# Patient Record
Sex: FEMALE | Race: WHITE | NOT HISPANIC OR LATINO | URBAN - METROPOLITAN AREA
[De-identification: names, ages, dates, MRNs, and addresses within clinical notes are randomized per-mention and may not be internally consistent; named-entity substitution may affect disease eponyms.]

---

## 2020-07-25 ENCOUNTER — TELEPHONE ENCOUNTER (OUTPATIENT)
Dept: URBAN - METROPOLITAN AREA CLINIC 13 | Facility: CLINIC | Age: 70
End: 2020-07-25

## 2020-07-25 RX ORDER — SODIUM PICOSULFATE, MAGNESIUM OXIDE, AND ANHYDROUS CITRIC ACID 10; 3.5; 12 MG/160ML; G/160ML; G/160ML
TAKE 1 ML DAILY LIQUID ORAL
Qty: 1 | Refills: 0 | OUTPATIENT
Start: 2019-07-12 | End: 2019-07-31

## 2020-07-25 RX ORDER — POLYETHYLENE GLYCOL 3350, SODIUM CHLORIDE, SODIUM BICARBONATE AND POTASSIUM CHLORIDE WITH LEMON FLAVOR 420; 11.2; 5.72; 1.48 G/4L; G/4L; G/4L; G/4L
MIX CONTENTS PER PKG DIRECTIONS. DAY PRIOR TO PROCEDURE BEGIN DRINKING 1/2 SOULTION @ 5PM, COMPLETE  SECOND 1/2 6HR PRIOR TO PROCEDURE POWDER, FOR SOLUTION ORAL
Qty: 1 | Refills: 0 | OUTPATIENT
Start: 2019-12-23 | End: 2020-01-20

## 2020-07-25 RX ORDER — POLYETHYLENE GLYCOL 3350, SODIUM CHLORIDE, SODIUM BICARBONATE AND POTASSIUM CHLORIDE WITH LEMON FLAVOR 420; 11.2; 5.72; 1.48 G/4L; G/4L; G/4L; G/4L
TAKE 1/2 GALLON AT 5:00 PM DAY BEFORE PROCEDURE, TAKE SECOND 1/2 OF GALLON 6 HRS PRIOR TO PROCEDURE POWDER, FOR SOLUTION ORAL
Qty: 1 | Refills: 0 | OUTPATIENT
Start: 2019-07-17 | End: 2019-07-31

## 2020-07-26 ENCOUNTER — TELEPHONE ENCOUNTER (OUTPATIENT)
Dept: URBAN - METROPOLITAN AREA CLINIC 13 | Facility: CLINIC | Age: 70
End: 2020-07-26

## 2020-07-26 RX ORDER — UBIDECARENONE 30 MG
TAKE 1 TABLET DAILY CAPSULE ORAL
Refills: 0 | Status: ACTIVE | COMMUNITY
Start: 2019-07-12

## 2020-07-26 RX ORDER — CLOBETASOL PROPIONATE 0.5 MG/G
CREAM TOPICAL
Qty: 30 | Refills: 0 | Status: ACTIVE | COMMUNITY
Start: 2019-10-23

## 2020-07-26 RX ORDER — ASCORBIC ACID 500 MG
TAKE 1 TABLET DAILY TABLET ORAL
Refills: 0 | Status: ACTIVE | COMMUNITY
Start: 2019-07-12

## 2020-07-26 RX ORDER — ASCORBIC ACID 1000 MG
TAKE 1 CAPSULE DAILY TABLET ORAL
Refills: 0 | Status: ACTIVE | COMMUNITY
Start: 2019-07-12

## 2020-07-26 RX ORDER — CLOBETASOL PROPIONATE 0.5 MG/G
CREAM TOPICAL
Qty: 30 | Refills: 0 | Status: ACTIVE | COMMUNITY
Start: 2019-08-28

## 2020-07-26 RX ORDER — ATORVASTATIN CALCIUM 20 MG/1
TAKE 1 TABLET DAILY AS DIRECTED TABLET, FILM COATED ORAL
Refills: 0 | Status: ACTIVE | COMMUNITY
Start: 2019-07-12

## 2020-07-26 RX ORDER — TRIAMCINOLONE ACETONIDE 1 MG/G
APPLY A THIN LAYER TO AFFECTED AREA(S) TWICE DAILY CREAM TOPICAL
Refills: 0 | Status: ACTIVE | COMMUNITY
Start: 2019-07-12

## 2020-07-26 RX ORDER — MUPIROCIN 20 MG/G
OINTMENT TOPICAL
Qty: 22 | Refills: 0 | Status: ACTIVE | COMMUNITY
Start: 2019-09-11

## 2020-08-11 ENCOUNTER — TELEPHONE ENCOUNTER (OUTPATIENT)
Dept: URBAN - METROPOLITAN AREA CLINIC 113 | Facility: CLINIC | Age: 70
End: 2020-08-11

## 2020-08-11 PROBLEM — 126859007: Status: ACTIVE | Noted: 2020-08-11

## 2021-02-03 ENCOUNTER — TELEPHONE ENCOUNTER (OUTPATIENT)
Dept: URBAN - METROPOLITAN AREA CLINIC 113 | Facility: CLINIC | Age: 71
End: 2021-02-03

## 2021-02-11 ENCOUNTER — LAB OUTSIDE AN ENCOUNTER (OUTPATIENT)
Dept: URBAN - METROPOLITAN AREA CLINIC 113 | Facility: CLINIC | Age: 71
End: 2021-02-11

## 2021-08-04 ENCOUNTER — LAB OUTSIDE AN ENCOUNTER (OUTPATIENT)
Dept: URBAN - METROPOLITAN AREA CLINIC 113 | Facility: CLINIC | Age: 71
End: 2021-08-04

## 2021-08-04 ENCOUNTER — TELEPHONE ENCOUNTER (OUTPATIENT)
Dept: URBAN - METROPOLITAN AREA CLINIC 113 | Facility: CLINIC | Age: 71
End: 2021-08-04

## 2021-08-04 VITALS — BODY MASS INDEX: 20.89 KG/M2 | HEIGHT: 66 IN | WEIGHT: 130 LBS

## 2021-08-04 RX ORDER — UBIDECARENONE 30 MG
TAKE 1 TABLET DAILY CAPSULE ORAL
Refills: 0 | Status: ACTIVE | COMMUNITY
Start: 2019-07-12

## 2021-08-04 RX ORDER — CLOBETASOL PROPIONATE 0.5 MG/G
CREAM TOPICAL
Qty: 30 | Refills: 0 | Status: ACTIVE | COMMUNITY
Start: 2019-08-28

## 2021-08-04 RX ORDER — MUPIROCIN 20 MG/G
OINTMENT TOPICAL
Qty: 22 | Refills: 0 | Status: ACTIVE | COMMUNITY
Start: 2019-09-11

## 2021-08-04 RX ORDER — TRIAMCINOLONE ACETONIDE 1 MG/G
APPLY A THIN LAYER TO AFFECTED AREA(S) TWICE DAILY CREAM TOPICAL
Refills: 0 | Status: ACTIVE | COMMUNITY
Start: 2019-07-12

## 2021-08-04 RX ORDER — ATORVASTATIN CALCIUM 20 MG/1
TAKE 1 TABLET DAILY AS DIRECTED TABLET, FILM COATED ORAL
Refills: 0 | Status: ACTIVE | COMMUNITY
Start: 2019-07-12

## 2021-08-04 RX ORDER — ASCORBIC ACID 1000 MG
TAKE 1 CAPSULE DAILY TABLET ORAL
Refills: 0 | Status: ACTIVE | COMMUNITY
Start: 2019-07-12

## 2021-08-04 RX ORDER — ASCORBIC ACID 500 MG
TAKE 1 TABLET DAILY TABLET ORAL
Refills: 0 | Status: ACTIVE | COMMUNITY
Start: 2019-07-12

## 2021-08-04 RX ORDER — POLYETHYLENE GLYCOL 3350, SODIUM SULFATE ANHYDROUS, SODIUM BICARBONATE, SODIUM CHLORIDE, POTASSIUM CHLORIDE 236; 22.74; 6.74; 5.86; 2.97 G/4L; G/4L; G/4L; G/4L; G/4L
AS DIRECTED POWDER, FOR SOLUTION ORAL
Qty: 4000 MILLILITER | Refills: 0 | OUTPATIENT
Start: 2021-08-04 | End: 2021-08-05

## 2021-09-20 ENCOUNTER — OFFICE VISIT (OUTPATIENT)
Dept: URBAN - METROPOLITAN AREA SURGERY CENTER 25 | Facility: SURGERY CENTER | Age: 71
End: 2021-09-20
Payer: MEDICARE

## 2021-09-20 ENCOUNTER — CLAIMS CREATED FROM THE CLAIM WINDOW (OUTPATIENT)
Dept: URBAN - METROPOLITAN AREA CLINIC 4 | Facility: CLINIC | Age: 71
End: 2021-09-20
Payer: MEDICARE

## 2021-09-20 DIAGNOSIS — Q43.8 TORTUOUS COLON: ICD-10-CM

## 2021-09-20 DIAGNOSIS — Z86.010 ADENOMAS PERSONAL HISTORY OF COLONIC POLYPS: ICD-10-CM

## 2021-09-20 DIAGNOSIS — D12.8 BENIGN NEOPLASM OF RECTUM: ICD-10-CM

## 2021-09-20 DIAGNOSIS — D12.8 ADENOMATOUS POLYP OF RECTUM: ICD-10-CM

## 2021-09-20 PROCEDURE — 45385 COLONOSCOPY W/LESION REMOVAL: CPT | Performed by: INTERNAL MEDICINE

## 2021-09-20 PROCEDURE — G8907 PT DOC NO EVENTS ON DISCHARG: HCPCS | Performed by: INTERNAL MEDICINE

## 2021-09-20 PROCEDURE — 88305 TISSUE EXAM BY PATHOLOGIST: CPT | Performed by: PATHOLOGY

## 2021-09-20 RX ORDER — ATORVASTATIN CALCIUM 20 MG/1
TAKE 1 TABLET DAILY AS DIRECTED TABLET, FILM COATED ORAL
Refills: 0 | Status: ACTIVE | COMMUNITY
Start: 2019-07-12

## 2021-09-20 RX ORDER — ASCORBIC ACID 1000 MG
TAKE 1 CAPSULE DAILY TABLET ORAL
Refills: 0 | Status: ACTIVE | COMMUNITY
Start: 2019-07-12

## 2021-09-20 RX ORDER — TRIAMCINOLONE ACETONIDE 1 MG/G
APPLY A THIN LAYER TO AFFECTED AREA(S) TWICE DAILY CREAM TOPICAL
Refills: 0 | Status: ACTIVE | COMMUNITY
Start: 2019-07-12

## 2021-09-20 RX ORDER — UBIDECARENONE 30 MG
TAKE 1 TABLET DAILY CAPSULE ORAL
Refills: 0 | Status: ACTIVE | COMMUNITY
Start: 2019-07-12

## 2021-09-20 RX ORDER — ASCORBIC ACID 500 MG
TAKE 1 TABLET DAILY TABLET ORAL
Refills: 0 | Status: ACTIVE | COMMUNITY
Start: 2019-07-12

## 2021-09-20 RX ORDER — MUPIROCIN 20 MG/G
OINTMENT TOPICAL
Qty: 22 | Refills: 0 | Status: ACTIVE | COMMUNITY
Start: 2019-09-11

## 2021-09-20 RX ORDER — CLOBETASOL PROPIONATE 0.5 MG/G
CREAM TOPICAL
Qty: 30 | Refills: 0 | Status: ACTIVE | COMMUNITY
Start: 2019-08-28

## 2021-10-04 ENCOUNTER — OFFICE VISIT (OUTPATIENT)
Dept: URBAN - METROPOLITAN AREA CLINIC 107 | Facility: CLINIC | Age: 71
End: 2021-10-04
Payer: MEDICARE

## 2021-10-04 VITALS
WEIGHT: 120 LBS | HEART RATE: 66 BPM | TEMPERATURE: 97.5 F | HEIGHT: 66 IN | SYSTOLIC BLOOD PRESSURE: 130 MMHG | BODY MASS INDEX: 19.29 KG/M2 | DIASTOLIC BLOOD PRESSURE: 68 MMHG

## 2021-10-04 DIAGNOSIS — K86.2 PANCREAS CYST: ICD-10-CM

## 2021-10-04 DIAGNOSIS — Z86.010 HISTORY OF ADENOMATOUS POLYP OF COLON: ICD-10-CM

## 2021-10-04 PROBLEM — 429047008: Status: ACTIVE | Noted: 2021-08-04

## 2021-10-04 PROBLEM — 31258000: Status: ACTIVE | Noted: 2021-10-03

## 2021-10-04 PROCEDURE — 99212 OFFICE O/P EST SF 10 MIN: CPT | Performed by: INTERNAL MEDICINE

## 2021-10-04 RX ORDER — ATORVASTATIN CALCIUM 20 MG/1
TAKE 1 TABLET DAILY AS DIRECTED TABLET, FILM COATED ORAL
Refills: 0 | Status: ACTIVE | COMMUNITY
Start: 2019-07-12

## 2021-10-04 RX ORDER — TRIAMCINOLONE ACETONIDE 1 MG/G
APPLY A THIN LAYER TO AFFECTED AREA(S) TWICE DAILY CREAM TOPICAL
Refills: 0 | Status: ON HOLD | COMMUNITY
Start: 2019-07-12

## 2021-10-04 RX ORDER — MUPIROCIN 20 MG/G
OINTMENT TOPICAL
Qty: 22 | Refills: 0 | Status: ON HOLD | COMMUNITY
Start: 2019-09-11

## 2021-10-04 RX ORDER — ASCORBIC ACID 500 MG
TAKE 1 TABLET DAILY TABLET ORAL
Refills: 0 | Status: ACTIVE | COMMUNITY
Start: 2019-07-12

## 2021-10-04 RX ORDER — ASCORBIC ACID 1000 MG
1 TABLET TABLET ORAL ONCE A DAY
Status: ACTIVE | COMMUNITY

## 2021-10-04 RX ORDER — ASCORBIC ACID 1000 MG
TAKE 1 CAPSULE DAILY TABLET ORAL
Refills: 0 | Status: ON HOLD | COMMUNITY
Start: 2019-07-12

## 2021-10-04 RX ORDER — IBUPROFEN 200 MG
1 TABLET WITH A MEAL CAPSULE ORAL
Status: ACTIVE | COMMUNITY

## 2021-10-04 RX ORDER — UBIDECARENONE 30 MG
TAKE 1 TABLET DAILY CAPSULE ORAL
Refills: 0 | Status: ACTIVE | COMMUNITY
Start: 2019-07-12

## 2021-10-04 RX ORDER — CLOBETASOL PROPIONATE 0.5 MG/G
CREAM TOPICAL
Qty: 30 | Refills: 0 | Status: ON HOLD | COMMUNITY
Start: 2019-08-28

## 2021-10-04 NOTE — HPI-TODAY'S VISIT:
71-year-old who originally presented with a positive Cologuard test.  She underwent colonoscopy on 7/31/2019 that revealed a 5 mm polyp in the sigmoid colon, a 7 mm polyp in the sigmoid colon and a 25 mm polyp in the rectum that was multilobulated sessile and laterally spreading.  This was lifted with saline.  The polyp was 5 cm from the anal verge in the posterior left side.  This was removed in a piecemeal fashion.  Colonoscopy was only successful to the transverse colon due to looping of the pediatric colonoscope and fixed colon with resistance of passage and bradycardia.  EGD scope was used to pass the mid transverse colon.  The sigmoid polyp was hyperplastic and the rectal polyp was a tubular adenoma.  Repeat colonoscopy was on 1/20/2020 that revealed a significantly tortuous colon EGD scope was used with manual pressure and water alone there was a 12 mm polyp in the cecum that was sessile removed in a piecemeal fashion by cold snare.  This was linear in addition there was a 10 mm polyp in the rectosigmoid that was removed by hot snare.  A 3 mm polyp was seen in the rectum that was removed.  There was thick adherent material in the cecum and right colon that was not able to wash off.  The polyp in the cecum was a sessile serrated polyp the rectosigmoid polyp was hyperplastic and the rectal polyp was a tubular adenoma.  Repeat colonoscopy examination on 9/20/2021 revealed again the colon was significantly tortuous and an EGD scope was used with manual pressure and patient was placed on her back.  Retroflexed view revealed no perianal lesions.  A 6 mm polyp was found in the distal rectum this was in the left posterior position 5 cm from the anal verge.  This was removed by cold snare.  Fibrous debris filled much of the cecum it was difficult to wash in the EGD scope was clogged.  The polyp was an adenoma. She has a history of a pancreatic cystic lesion.  She had repeat MRI on 3/6/2021 that revealed a few subcentimeter cystic lesions in the liver likely cyst versus hamartomas, normal common bile duct, a multiloculated cystic lesion in the head and uncinate process 1.3 cm and the second 1.4 cm.  Tiny cystic lesion in the tail of pancreas.  There were no suspicious features and this was felt to be stable from previous MRI by the radiologist. She is doing well.  She has a bowel movement every day and has been no blood or melena, nausea or vomiting, fevers or chills.  She's had no heartburn or dysphagia.  There is no abdominal pain.

## 2023-05-17 ENCOUNTER — OFFICE VISIT (OUTPATIENT)
Dept: URBAN - METROPOLITAN AREA CLINIC 113 | Facility: CLINIC | Age: 73
End: 2023-05-17
Payer: MEDICARE

## 2023-05-17 ENCOUNTER — WEB ENCOUNTER (OUTPATIENT)
Dept: URBAN - METROPOLITAN AREA CLINIC 113 | Facility: CLINIC | Age: 73
End: 2023-05-17

## 2023-05-17 ENCOUNTER — LAB OUTSIDE AN ENCOUNTER (OUTPATIENT)
Dept: URBAN - METROPOLITAN AREA CLINIC 113 | Facility: CLINIC | Age: 73
End: 2023-05-17

## 2023-05-17 VITALS
DIASTOLIC BLOOD PRESSURE: 68 MMHG | TEMPERATURE: 97.3 F | SYSTOLIC BLOOD PRESSURE: 137 MMHG | WEIGHT: 123 LBS | BODY MASS INDEX: 19.77 KG/M2 | HEART RATE: 63 BPM | RESPIRATION RATE: 12 BRPM | HEIGHT: 66 IN

## 2023-05-17 DIAGNOSIS — Z86.010 HISTORY OF ADENOMATOUS POLYP OF COLON: ICD-10-CM

## 2023-05-17 DIAGNOSIS — K86.2 PANCREAS CYST: ICD-10-CM

## 2023-05-17 PROCEDURE — 99213 OFFICE O/P EST LOW 20 MIN: CPT | Performed by: INTERNAL MEDICINE

## 2023-05-17 RX ORDER — UBIDECARENONE 30 MG
TAKE 1 TABLET DAILY CAPSULE ORAL
Refills: 0 | Status: ACTIVE | COMMUNITY
Start: 2019-07-12

## 2023-05-17 RX ORDER — IBUPROFEN 200 MG
1 TABLET WITH A MEAL CAPSULE ORAL
Status: ACTIVE | COMMUNITY

## 2023-05-17 RX ORDER — POLYETHYLENE GLYCOL 3350, SODIUM CHLORIDE, SODIUM BICARBONATE, POTASSIUM CHLORIDE 420; 11.2; 5.72; 1.48 G/4L; G/4L; G/4L; G/4L
420 G POWDER, FOR SOLUTION ORAL ONCE
Qty: 420 GM | Refills: 0 | OUTPATIENT
Start: 2023-05-17 | End: 2023-05-18

## 2023-05-17 RX ORDER — TRIAMCINOLONE ACETONIDE 1 MG/G
APPLY A THIN LAYER TO AFFECTED AREA(S) TWICE DAILY CREAM TOPICAL
Refills: 0 | Status: ON HOLD | COMMUNITY
Start: 2019-07-12

## 2023-05-17 RX ORDER — ASCORBIC ACID 500 MG
TAKE 1 TABLET DAILY TABLET ORAL
Refills: 0 | Status: ACTIVE | COMMUNITY
Start: 2019-07-12

## 2023-05-17 RX ORDER — CLOBETASOL PROPIONATE 0.5 MG/G
CREAM TOPICAL
Qty: 30 | Refills: 0 | Status: ON HOLD | COMMUNITY
Start: 2019-08-28

## 2023-05-17 RX ORDER — ATORVASTATIN CALCIUM 20 MG/1
TAKE 1 TABLET DAILY AS DIRECTED TABLET, FILM COATED ORAL
Refills: 0 | Status: ACTIVE | COMMUNITY
Start: 2019-07-12

## 2023-05-17 RX ORDER — ASCORBIC ACID 1000 MG
TAKE 1 CAPSULE DAILY TABLET ORAL
Refills: 0 | Status: ON HOLD | COMMUNITY
Start: 2019-07-12

## 2023-05-17 RX ORDER — ASCORBIC ACID 1000 MG
1 TABLET TABLET ORAL ONCE A DAY
Status: ACTIVE | COMMUNITY

## 2023-05-17 RX ORDER — MUPIROCIN 20 MG/G
OINTMENT TOPICAL
Qty: 22 | Refills: 0 | Status: ON HOLD | COMMUNITY
Start: 2019-09-11

## 2023-05-17 NOTE — HPI-TODAY'S VISIT:
72-year-old who originally presented with a positive Cologuard test.  She underwent colonoscopy on 7/31/2019 that revealed a 5 mm polyp in the sigmoid colon, a 7 mm polyp in the sigmoid colon and a 25 mm polyp in the rectum that was multilobulated sessile and laterally spreading.  This was lifted with saline.  The polyp was 5 cm from the anal verge in the posterior left side.  This was removed in a piecemeal fashion.  Colonoscopy was only successful to the transverse colon due to looping of the pediatric colonoscope and fixed colon with resistance of passage and bradycardia.  EGD scope was used to pass the mid transverse colon.  The sigmoid polyp was hyperplastic and the rectal polyp was a tubular adenoma.  Repeat colonoscopy was on 1/20/2020 that revealed a significantly tortuous colon EGD scope was used with manual pressure and water alone there was a 12 mm polyp in the cecum that was sessile removed in a piecemeal fashion by cold snare.  This was linear in addition there was a 10 mm polyp in the rectosigmoid that was removed by hot snare.  A 3 mm polyp was seen in the rectum that was removed.  There was thick adherent material in the cecum and right colon that was not able to wash off.  The polyp in the cecum was a sessile serrated polyp the rectosigmoid polyp was hyperplastic and the rectal polyp was a tubular adenoma.  Repeat colonoscopy examination on 9/20/2021 revealed again the colon was significantly tortuous and an EGD scope was used with manual pressure and patient was placed on her back.  Retroflexed view revealed no perianal lesions.  A 6 mm polyp was found in the distal rectum this was in the left posterior position 5 cm from the anal verge.  This was removed by cold snare.  Fibrous debris filled much of the cecum it was difficult to wash in the EGD scope was clogged.  The polyp was an adenoma. She has a history of a pancreatic cystic lesion.  She had repeat MRI on 3/6/2021 that revealed a few subcentimeter cystic lesions in the liver likely cyst versus hamartomas, normal common bile duct, a multiloculated cystic lesion in the head and uncinate process 1.3 cm and the second 1.4 cm.  Tiny cystic lesion in the tail of pancreas.  There were no suspicious features and this was felt to be stable from previous MRI by the radiologist. She feels well.  She states that she has lost a little bit of weight.  Her appetite is about the same.  There is no nausea or vomiting, fevers or chills or abdominal pain.  She denies any heartburn or dysphagia.  She does move her bowels every day and has been no blood or melena. Blood work and 4/12/23 revealed a hemoglobin of 13.5, WBC 4.8 and platelet count 202,000.  Sodium 141 potassium 4.2 BUN 9 creatinine 0.81.  AST 27, ALT 21, alkaline phosphatase is 91, total bili 0.5.

## 2023-07-17 ENCOUNTER — CLAIMS CREATED FROM THE CLAIM WINDOW (OUTPATIENT)
Dept: URBAN - METROPOLITAN AREA SURGERY CENTER 25 | Facility: SURGERY CENTER | Age: 73
End: 2023-07-17

## 2023-07-17 ENCOUNTER — OUT OF OFFICE VISIT (OUTPATIENT)
Dept: URBAN - METROPOLITAN AREA SURGERY CENTER 25 | Facility: SURGERY CENTER | Age: 73
End: 2023-07-17
Payer: MEDICARE

## 2023-07-17 DIAGNOSIS — Z86.010 ADENOMAS PERSONAL HISTORY OF COLONIC POLYPS: ICD-10-CM

## 2023-07-17 DIAGNOSIS — D12.8 ADENOMATOUS POLYP OF RECTUM: ICD-10-CM

## 2023-07-17 DIAGNOSIS — Z12.11 COLON CANCER SCREENING (HIGH RISK): ICD-10-CM

## 2023-07-17 PROCEDURE — 00811 ANES LWR INTST NDSC NOS: CPT | Performed by: NURSE ANESTHETIST, CERTIFIED REGISTERED

## 2023-07-17 PROCEDURE — 00811 ANES LWR INTST NDSC NOS: CPT | Performed by: ANESTHESIOLOGY

## 2023-07-17 PROCEDURE — G0105 COLORECTAL SCRN; HI RISK IND: HCPCS | Performed by: INTERNAL MEDICINE

## 2023-07-17 PROCEDURE — G8907 PT DOC NO EVENTS ON DISCHARG: HCPCS | Performed by: INTERNAL MEDICINE

## 2023-07-17 RX ORDER — TRIAMCINOLONE ACETONIDE 1 MG/G
APPLY A THIN LAYER TO AFFECTED AREA(S) TWICE DAILY CREAM TOPICAL
Refills: 0 | Status: ON HOLD | COMMUNITY
Start: 2019-07-12

## 2023-07-17 RX ORDER — CLOBETASOL PROPIONATE 0.5 MG/G
CREAM TOPICAL
Qty: 30 | Refills: 0 | Status: ON HOLD | COMMUNITY
Start: 2019-08-28

## 2023-07-17 RX ORDER — ATORVASTATIN CALCIUM 20 MG/1
TAKE 1 TABLET DAILY AS DIRECTED TABLET, FILM COATED ORAL
Refills: 0 | Status: ACTIVE | COMMUNITY
Start: 2019-07-12

## 2023-07-17 RX ORDER — MUPIROCIN 20 MG/G
OINTMENT TOPICAL
Qty: 22 | Refills: 0 | Status: ON HOLD | COMMUNITY
Start: 2019-09-11

## 2023-07-17 RX ORDER — ASCORBIC ACID 1000 MG
1 TABLET TABLET ORAL ONCE A DAY
Status: ACTIVE | COMMUNITY

## 2023-07-17 RX ORDER — ASCORBIC ACID 500 MG
TAKE 1 TABLET DAILY TABLET ORAL
Refills: 0 | Status: ACTIVE | COMMUNITY
Start: 2019-07-12

## 2023-07-17 RX ORDER — ASCORBIC ACID 1000 MG
TAKE 1 CAPSULE DAILY TABLET ORAL
Refills: 0 | Status: ON HOLD | COMMUNITY
Start: 2019-07-12

## 2023-07-17 RX ORDER — IBUPROFEN 200 MG
1 TABLET WITH A MEAL CAPSULE ORAL
Status: ACTIVE | COMMUNITY

## 2023-07-17 RX ORDER — UBIDECARENONE 30 MG
TAKE 1 TABLET DAILY CAPSULE ORAL
Refills: 0 | Status: ACTIVE | COMMUNITY
Start: 2019-07-12

## 2023-08-23 ENCOUNTER — DASHBOARD ENCOUNTERS (OUTPATIENT)
Age: 73
End: 2023-08-23

## 2023-08-23 ENCOUNTER — LAB OUTSIDE AN ENCOUNTER (OUTPATIENT)
Dept: URBAN - METROPOLITAN AREA CLINIC 107 | Facility: CLINIC | Age: 73
End: 2023-08-23

## 2023-08-23 ENCOUNTER — TELEPHONE ENCOUNTER (OUTPATIENT)
Dept: URBAN - METROPOLITAN AREA CLINIC 113 | Facility: CLINIC | Age: 73
End: 2023-08-23

## 2023-08-23 ENCOUNTER — OFFICE VISIT (OUTPATIENT)
Dept: URBAN - METROPOLITAN AREA CLINIC 107 | Facility: CLINIC | Age: 73
End: 2023-08-23
Payer: MEDICARE

## 2023-08-23 VITALS
BODY MASS INDEX: 19.13 KG/M2 | WEIGHT: 119 LBS | HEIGHT: 66 IN | RESPIRATION RATE: 16 BRPM | DIASTOLIC BLOOD PRESSURE: 73 MMHG | HEART RATE: 59 BPM | SYSTOLIC BLOOD PRESSURE: 129 MMHG | TEMPERATURE: 97.1 F

## 2023-08-23 DIAGNOSIS — K86.2 PANCREAS CYST: ICD-10-CM

## 2023-08-23 DIAGNOSIS — Z86.010 HISTORY OF ADENOMATOUS POLYP OF COLON: ICD-10-CM

## 2023-08-23 PROCEDURE — 99213 OFFICE O/P EST LOW 20 MIN: CPT | Performed by: INTERNAL MEDICINE

## 2023-08-23 RX ORDER — MUPIROCIN 20 MG/G
OINTMENT TOPICAL
Qty: 22 | Refills: 0 | Status: ON HOLD | COMMUNITY
Start: 2019-09-11

## 2023-08-23 RX ORDER — ASCORBIC ACID 1000 MG
TAKE 1 CAPSULE DAILY TABLET ORAL
Refills: 0 | Status: ON HOLD | COMMUNITY
Start: 2019-07-12

## 2023-08-23 RX ORDER — TRIAMCINOLONE ACETONIDE 1 MG/G
APPLY A THIN LAYER TO AFFECTED AREA(S) TWICE DAILY CREAM TOPICAL
Refills: 0 | Status: ON HOLD | COMMUNITY
Start: 2019-07-12

## 2023-08-23 RX ORDER — IBUPROFEN 200 MG
1 TABLET WITH A MEAL CAPSULE ORAL
Status: ACTIVE | COMMUNITY

## 2023-08-23 RX ORDER — ASCORBIC ACID 1000 MG
1 TABLET TABLET ORAL ONCE A DAY
Status: ACTIVE | COMMUNITY

## 2023-08-23 RX ORDER — UBIDECARENONE 30 MG
TAKE 1 TABLET DAILY CAPSULE ORAL
Refills: 0 | Status: ACTIVE | COMMUNITY
Start: 2019-07-12

## 2023-08-23 RX ORDER — POLYETHYLENE GLYCOL 3350, SODIUM CHLORIDE, SODIUM BICARBONATE, POTASSIUM CHLORIDE 420; 11.2; 5.72; 1.48 G/4L; G/4L; G/4L; G/4L
420 G POWDER, FOR SOLUTION ORAL ONCE
Qty: 4000 GM | Refills: 0 | OUTPATIENT
Start: 2023-08-23 | End: 2023-08-24

## 2023-08-23 RX ORDER — ATORVASTATIN CALCIUM 20 MG/1
TAKE 1 TABLET DAILY AS DIRECTED TABLET, FILM COATED ORAL
Refills: 0 | Status: ACTIVE | COMMUNITY
Start: 2019-07-12

## 2023-08-23 RX ORDER — CLOBETASOL PROPIONATE 0.5 MG/G
CREAM TOPICAL
Qty: 30 | Refills: 0 | Status: ON HOLD | COMMUNITY
Start: 2019-08-28

## 2023-08-23 RX ORDER — ASCORBIC ACID 500 MG
TAKE 1 TABLET DAILY TABLET ORAL
Refills: 0 | Status: ACTIVE | COMMUNITY
Start: 2019-07-12

## 2023-08-23 NOTE — HPI-TODAY'S VISIT:
73-year-old who originally presented with a positive Cologuard test.  She underwent colonoscopy on 7/31/2019 that revealed a 5 mm polyp in the sigmoid colon, a 7 mm polyp in the sigmoid colon and a 25 mm polyp in the rectum that was multilobulated sessile and laterally spreading.  This was lifted with saline.  The polyp was 5 cm from the anal verge in the posterior left side.  This was removed in a piecemeal fashion.  Colonoscopy was only successful to the transverse colon due to looping of the pediatric colonoscope and fixed colon with resistance of passage and bradycardia.  EGD scope was used to pass the mid transverse colon.  The sigmoid polyp was hyperplastic and the rectal polyp was a tubular adenoma.  Repeat colonoscopy was on 1/20/2020 that revealed a significantly tortuous colon EGD scope was used with manual pressure and water alone there was a 12 mm polyp in the cecum that was sessile removed in a piecemeal fashion by cold snare.  This was linear in addition there was a 10 mm polyp in the rectosigmoid that was removed by hot snare.  A 3 mm polyp was seen in the rectum that was removed.  There was thick adherent material in the cecum and right colon that was not able to wash off.  The polyp in the cecum was a sessile serrated polyp the rectosigmoid polyp was hyperplastic and the rectal polyp was a tubular adenoma.  Repeat colonoscopy examination on 9/20/2021 revealed again the colon was significantly tortuous and an EGD scope was used with manual pressure and patient was placed on her back.  Retroflexed view revealed no perianal lesions.  A 6 mm polyp was found in the distal rectum this was in the left posterior position 5 cm from the anal verge.  This was removed by cold snare.  Fibrous debris filled much of the cecum it was difficult to wash in the EGD scope was clogged.  The polyp was an adenoma. She has a history of a pancreatic cystic lesion.  She had repeat MRI on 3/6/2021 that revealed a few subcentimeter cystic lesions in the liver likely cyst versus hamartomas, normal common bile duct, a multiloculated cystic lesion in the head and uncinate process 1.3 cm and the second 1.4 cm.  Tiny cystic lesion in the tail of pancreas.  There were no suspicious features and this was felt to be stable from previous MRI by the radiologist.  Colonoscopy on 7/17/2023 revealed regrowth of polyp at postpolypectomy site and rectum.  This was 2 cm in size.  It is around the area of the scar tissue.  She has been doing well and is having a bowel movement every day there is been no blood or melena or abdominal pain.  She denies any heartburn or dysphagia.  There is no nausea or vomiting. Blood work and 4/12/23 revealed a hemoglobin of 13.5, WBC 4.8 and platelet count 202,000.  Sodium 141 potassium 4.2 BUN 9 creatinine 0.81.  AST 27, ALT 21, alkaline phosphatase is 91, total bili 0.5.

## 2023-08-23 NOTE — HPI-OTHER HISTORIES
Colonoscopy on 7/17/2023 revealed a significantly tortuous, multiply angulated fixed left colon, EGD scope with water loading was used to get beyond this area.  There is thick adherent material in the cecum and ascending colon requiring multiple washes.  The terminal ileum was normal.  There is a 20 mm sessile polyp in the rectum posterior left side 6 cm from the anal verge.  This is in the area of prior polypectomy.

## 2023-09-01 ENCOUNTER — LAB OUTSIDE AN ENCOUNTER (OUTPATIENT)
Dept: URBAN - METROPOLITAN AREA CLINIC 113 | Facility: CLINIC | Age: 73
End: 2023-09-01

## 2023-09-12 ENCOUNTER — OFFICE VISIT (OUTPATIENT)
Dept: URBAN - METROPOLITAN AREA MEDICAL CENTER 19 | Facility: MEDICAL CENTER | Age: 73
End: 2023-09-12
Payer: MEDICARE

## 2023-09-12 DIAGNOSIS — D12.8 ADENOMATOUS POLYP OF RECTUM: ICD-10-CM

## 2023-09-12 DIAGNOSIS — K63.5 COLON POLYP: ICD-10-CM

## 2023-09-12 PROCEDURE — 45390 COLONOSCOPY W/RESECTION: CPT | Performed by: INTERNAL MEDICINE

## 2023-09-12 RX ORDER — IBUPROFEN 200 MG
1 TABLET WITH A MEAL CAPSULE ORAL
Status: ACTIVE | COMMUNITY

## 2023-09-12 RX ORDER — CLOBETASOL PROPIONATE 0.5 MG/G
CREAM TOPICAL
Qty: 30 | Refills: 0 | Status: ON HOLD | COMMUNITY
Start: 2019-08-28

## 2023-09-12 RX ORDER — ASCORBIC ACID 500 MG
TAKE 1 TABLET DAILY TABLET ORAL
Refills: 0 | Status: ACTIVE | COMMUNITY
Start: 2019-07-12

## 2023-09-12 RX ORDER — ATORVASTATIN CALCIUM 20 MG/1
TAKE 1 TABLET DAILY AS DIRECTED TABLET, FILM COATED ORAL
Refills: 0 | Status: ACTIVE | COMMUNITY
Start: 2019-07-12

## 2023-09-12 RX ORDER — TRIAMCINOLONE ACETONIDE 1 MG/G
APPLY A THIN LAYER TO AFFECTED AREA(S) TWICE DAILY CREAM TOPICAL
Refills: 0 | Status: ON HOLD | COMMUNITY
Start: 2019-07-12

## 2023-09-12 RX ORDER — MUPIROCIN 20 MG/G
OINTMENT TOPICAL
Qty: 22 | Refills: 0 | Status: ON HOLD | COMMUNITY
Start: 2019-09-11

## 2023-09-12 RX ORDER — ASCORBIC ACID 1000 MG
1 TABLET TABLET ORAL ONCE A DAY
Status: ACTIVE | COMMUNITY

## 2023-09-12 RX ORDER — UBIDECARENONE 30 MG
TAKE 1 TABLET DAILY CAPSULE ORAL
Refills: 0 | Status: ACTIVE | COMMUNITY
Start: 2019-07-12

## 2023-09-12 RX ORDER — ASCORBIC ACID 1000 MG
TAKE 1 CAPSULE DAILY TABLET ORAL
Refills: 0 | Status: ON HOLD | COMMUNITY
Start: 2019-07-12

## 2023-09-18 ENCOUNTER — TELEPHONE ENCOUNTER (OUTPATIENT)
Dept: URBAN - METROPOLITAN AREA CLINIC 113 | Facility: CLINIC | Age: 73
End: 2023-09-18

## 2024-07-10 ENCOUNTER — LAB OUTSIDE AN ENCOUNTER (OUTPATIENT)
Dept: URBAN - METROPOLITAN AREA CLINIC 113 | Facility: CLINIC | Age: 74
End: 2024-07-10

## 2024-07-23 ENCOUNTER — LAB OUTSIDE AN ENCOUNTER (OUTPATIENT)
Dept: URBAN - METROPOLITAN AREA CLINIC 113 | Facility: CLINIC | Age: 74
End: 2024-07-23

## 2024-09-30 ENCOUNTER — OFFICE VISIT (OUTPATIENT)
Dept: URBAN - METROPOLITAN AREA MEDICAL CENTER 19 | Facility: MEDICAL CENTER | Age: 74
End: 2024-09-30

## 2024-10-03 ENCOUNTER — TELEPHONE ENCOUNTER (OUTPATIENT)
Dept: URBAN - METROPOLITAN AREA CLINIC 113 | Facility: CLINIC | Age: 74
End: 2024-10-03